# Patient Record
Sex: FEMALE | Race: OTHER | HISPANIC OR LATINO | URBAN - METROPOLITAN AREA
[De-identification: names, ages, dates, MRNs, and addresses within clinical notes are randomized per-mention and may not be internally consistent; named-entity substitution may affect disease eponyms.]

---

## 2021-11-16 ENCOUNTER — OUTPATIENT (OUTPATIENT)
Dept: OUTPATIENT SERVICES | Facility: HOSPITAL | Age: 56
LOS: 1 days | End: 2021-11-16

## 2021-11-16 ENCOUNTER — APPOINTMENT (OUTPATIENT)
Dept: CT IMAGING | Facility: CLINIC | Age: 56
End: 2021-11-16
Payer: COMMERCIAL

## 2021-11-16 PROCEDURE — 75574 CT ANGIO HRT W/3D IMAGE: CPT | Mod: 26

## 2023-10-17 PROBLEM — Z00.00 ENCOUNTER FOR PREVENTIVE HEALTH EXAMINATION: Status: ACTIVE | Noted: 2023-10-17

## 2023-10-30 ENCOUNTER — APPOINTMENT (OUTPATIENT)
Dept: NEUROSURGERY | Facility: CLINIC | Age: 58
End: 2023-10-30

## 2023-11-07 ENCOUNTER — APPOINTMENT (OUTPATIENT)
Dept: NEUROSURGERY | Facility: CLINIC | Age: 58
End: 2023-11-07
Payer: MEDICAID

## 2023-11-07 VITALS
HEIGHT: 62 IN | WEIGHT: 179 LBS | BODY MASS INDEX: 32.94 KG/M2 | OXYGEN SATURATION: 98 % | HEART RATE: 62 BPM | SYSTOLIC BLOOD PRESSURE: 120 MMHG | RESPIRATION RATE: 18 BRPM | DIASTOLIC BLOOD PRESSURE: 72 MMHG

## 2023-11-07 DIAGNOSIS — E78.5 HYPERLIPIDEMIA, UNSPECIFIED: ICD-10-CM

## 2023-11-07 DIAGNOSIS — E11.9 TYPE 2 DIABETES MELLITUS W/OUT COMPLICATIONS: ICD-10-CM

## 2023-11-07 DIAGNOSIS — Z86.39 PERSONAL HISTORY OF OTHER ENDOCRINE, NUTRITIONAL AND METABOLIC DISEASE: ICD-10-CM

## 2023-11-07 DIAGNOSIS — I10 ESSENTIAL (PRIMARY) HYPERTENSION: ICD-10-CM

## 2023-11-07 PROCEDURE — 99204 OFFICE O/P NEW MOD 45 MIN: CPT

## 2023-11-07 RX ORDER — LEVOTHYROXINE SODIUM 200 UG/1
CAPSULE ORAL
Refills: 0 | Status: ACTIVE | COMMUNITY

## 2023-11-07 RX ORDER — FENOFIBRATE 150 MG/1
CAPSULE ORAL
Refills: 0 | Status: ACTIVE | COMMUNITY

## 2023-11-07 RX ORDER — ATORVASTATIN CALCIUM 80 MG/1
TABLET, FILM COATED ORAL
Refills: 0 | Status: ACTIVE | COMMUNITY

## 2023-11-07 RX ORDER — METFORMIN HYDROCHLORIDE 625 MG/1
TABLET ORAL
Refills: 0 | Status: ACTIVE | COMMUNITY

## 2023-11-07 RX ORDER — LISINOPRIL 30 MG/1
TABLET ORAL
Refills: 0 | Status: ACTIVE | COMMUNITY

## 2023-11-07 RX ORDER — VALSARTAN 40 MG/1
TABLET, COATED ORAL
Refills: 0 | Status: ACTIVE | COMMUNITY

## 2023-11-09 LAB
CORTIS SERPL-MCNC: 9.2 UG/DL
FSH SERPL-MCNC: 39.3 IU/L
GH SERPL-MCNC: 0.03 NG/ML
IGF-1 INTERP: NORMAL
IGF-I BLD-MCNC: 79 NG/ML
LH SERPL-ACNC: 20.8 IU/L
PROLACTIN SERPL-MCNC: 6.3 NG/ML
T4 SERPL-MCNC: 8.9 UG/DL
TSH SERPL-ACNC: 2.87 UIU/ML

## 2023-11-10 LAB — ACTH-ESO: 19 PG/ML

## 2023-12-05 ENCOUNTER — APPOINTMENT (OUTPATIENT)
Dept: NEUROSURGERY | Facility: CLINIC | Age: 58
End: 2023-12-05

## 2023-12-15 ENCOUNTER — OUTPATIENT (OUTPATIENT)
Dept: OUTPATIENT SERVICES | Facility: HOSPITAL | Age: 58
LOS: 1 days | End: 2023-12-15
Payer: COMMERCIAL

## 2023-12-15 ENCOUNTER — APPOINTMENT (OUTPATIENT)
Dept: MRI IMAGING | Facility: HOSPITAL | Age: 58
End: 2023-12-15

## 2023-12-15 PROCEDURE — 70553 MRI BRAIN STEM W/O & W/DYE: CPT | Mod: 26

## 2023-12-15 PROCEDURE — A9585: CPT

## 2023-12-15 PROCEDURE — 70553 MRI BRAIN STEM W/O & W/DYE: CPT

## 2023-12-18 ENCOUNTER — NON-APPOINTMENT (OUTPATIENT)
Age: 58
End: 2023-12-18

## 2024-01-18 ENCOUNTER — APPOINTMENT (OUTPATIENT)
Dept: NEUROSURGERY | Facility: CLINIC | Age: 59
End: 2024-01-18
Payer: MEDICAID

## 2024-01-18 VITALS
HEIGHT: 62 IN | RESPIRATION RATE: 18 BRPM | WEIGHT: 179 LBS | HEART RATE: 60 BPM | OXYGEN SATURATION: 98 % | DIASTOLIC BLOOD PRESSURE: 75 MMHG | SYSTOLIC BLOOD PRESSURE: 125 MMHG | BODY MASS INDEX: 32.94 KG/M2 | TEMPERATURE: 97.3 F

## 2024-01-18 DIAGNOSIS — D35.2 BENIGN NEOPLASM OF PITUITARY GLAND: ICD-10-CM

## 2024-01-18 PROCEDURE — 99214 OFFICE O/P EST MOD 30 MIN: CPT

## 2024-01-19 PROBLEM — D35.2 PITUITARY ADENOMA: Status: ACTIVE | Noted: 2023-11-07

## 2024-01-19 NOTE — DATA REVIEWED
[de-identified] : ACC: [id number]     EXAM:  MR BRAIN WAW IC   ORDERED BY: [address]  PROCEDURE DATE:  12/15/2023    INTERPRETATION:  INDICATION: Pituitary adenoma workup.  TECHNIQUE: Sagittal and coronal T1 and coronal T2 weighted images of the sella and axial FLAIR images of the brain were obtained. Following the intravenous administration of Multihance, sagittal and coronal T1 weighted, coronal VIBE images through the sella, sagittal 3D T1 weighted images through the brain were obtained and gradient echo and diffusion imaging of the brain is obtained.  COMPARISON: None.  FINDINGS: The pituitary gland is normal in height with a normal superior concave border measuring 5.3 mm in craniocaudal diameter. There is a 1.2 mm focus of relative less enhancement just to the right of midline at the level of the stalk which could represent a small cyst versus a microadenoma. Prior images are not available for comparison. The stalk is midline. The cavernous sinuses enhance normally. The optic chiasm is normal..   Evaluation of the brain demonstrates the ventricles, cisternal spaces and cortical sulci to be appropriate for the patient stated age. There is no midline shift or extra-axial collections. Small vessel white matter ischemic changes are noted.  After contrast administration there is no abnormal parenchymal or leptomeningeal enhancement.    IMPRESSION: Small 1.2 mm focus of relative less enhancement just to the right of the midline at the level of the stalk within the pituitary gland could represent a tiny microadenoma versus a cyst. Small vessel white matter ischemic changes. No abnormal parenchymal or leptomeningeal enhancement.  --- End of Report ---     KHALED AL TAWIL MD; Resident Radiologist This document has been electronically signed. MONICA DELANEY MD; Attending Radiologist This document has been electronically signed. Dec 18 2023  9:58AM

## 2024-01-19 NOTE — ASSESSMENT
[FreeTextEntry1] : I reviewed the results of the most recent MRI imaging with the patient, which shows notable decreased size of pituitary mass. I also shared the results of her endocrine lab work up which is also grossly WNL except for slightly low GH. Patient is asymptomatic at this time. She will need to return to clinic with repeat MRI Brain at the end of this year.   PLAN: - repeat MRI Pituitary in Dec 2024 - follow up with Dr. Boone to review imaging   The patient was instructed that if she experiences symptoms of severe headache, syncope, unexplained nausea/vomiting, visual changes, seizure-like activity, new weakness or numbness of extremities, she should immediately call 911 or go to the emergency department.   Patient verbalizes agreement and understanding with plan of care.  I, Dr. Boone, personally performed the evaluation and management (E/M) services for this established patient who presents today with (a) new problem(s)/exacerbation of (an) existing condition(s). That E/M includes conducting the examination, assessing all new/exacerbated conditions, and establishing a new plan of care. Today, LATOYA Herzog, was here to observe my evaluation and management services for this new problem/exacerbated condition to be followed going forward.

## 2024-01-19 NOTE — PHYSICAL EXAM
[General Appearance - Alert] : alert [General Appearance - In No Acute Distress] : in no acute distress [General Appearance - Well Nourished] : well nourished [General Appearance - Well-Appearing] : healthy appearing [Oriented To Time, Place, And Person] : oriented to person, place, and time [Impaired Insight] : insight and judgment were intact [Affect] : the affect was normal [Memory Recent] : recent memory was not impaired [Motor Tone] : muscle tone was normal in all four extremities [Motor Strength] : muscle strength was normal in all four extremities [Balance] : balance was intact [Sclera] : the sclera and conjunctiva were normal [PERRL With Normal Accommodation] : pupils were equal in size, round, reactive to light, with normal accommodation [Outer Ear] : the ears and nose were normal in appearance [Neck Appearance] : the appearance of the neck was normal [] : no respiratory distress [Heart Rate And Rhythm] : heart rate was normal and rhythm regular [Abdomen Soft] : soft [Abnormal Walk] : normal gait [Skin Color & Pigmentation] : normal skin color and pigmentation [Skin Lesions] : no skin lesions

## 2024-01-19 NOTE — REASON FOR VISIT
[Follow-Up: _____] : a [unfilled] follow-up visit [Family Member] : family member [FreeTextEntry1] : Pituitary Adenoma [Interpreters_IDNumber] : [id number] [TWNoteComboBox1] : Martiniquais

## 2024-01-19 NOTE — HISTORY OF PRESENT ILLNESS
[FreeTextEntry1] : Pituitary Adenoma, Headaches [de-identified] : JUAN CARLOS THOMAS is a 58-year-old woman with a PMHx significant for HTN, HLD, Hypothyroidism, and DM II who was referred by Dr. Brianne Pedroza for neurosurgical evaluation of pituitary adenoma.  Patient had been suffering from migraines and workup imaging revealed a lesion on Pituitary gland. Last MRI was from 6/2022. She states she does have hypothyroidism and is on Levothyroxine which is managed by her PCP but she does not have an Endocrinologist.  TODAY 1/18/24 Patient presents to office tto review MRI Pituitary with and without contrast done on 12/15/23. Endocrine Labs done on 11/7/23 with GH 0.03, non-circulatory.  She states she continues to have daily headaches, near front of her head Denies changes in ring/shoe size. Denies excessive urination. Denies changes in vision. Denies galactorrhea.

## 2024-12-12 ENCOUNTER — RESULT REVIEW (OUTPATIENT)
Age: 59
End: 2024-12-12

## 2024-12-12 ENCOUNTER — OUTPATIENT (OUTPATIENT)
Dept: OUTPATIENT SERVICES | Facility: HOSPITAL | Age: 59
LOS: 1 days | End: 2024-12-12
Payer: MEDICAID

## 2024-12-12 ENCOUNTER — APPOINTMENT (OUTPATIENT)
Dept: MRI IMAGING | Facility: HOSPITAL | Age: 59
End: 2024-12-12

## 2024-12-12 PROCEDURE — A9585: CPT

## 2024-12-12 PROCEDURE — 70553 MRI BRAIN STEM W/O & W/DYE: CPT

## 2024-12-12 PROCEDURE — 70553 MRI BRAIN STEM W/O & W/DYE: CPT | Mod: 26

## 2025-01-07 PROBLEM — E11.9 DIABETES: Status: RESOLVED | Noted: 2023-11-07 | Resolved: 2025-01-07

## 2025-01-07 PROBLEM — Z86.39 HISTORY OF HYPOTHYROIDISM: Status: RESOLVED | Noted: 2023-11-07 | Resolved: 2025-01-07

## 2025-01-07 PROBLEM — E78.5 HYPERLIPIDEMIA: Status: RESOLVED | Noted: 2023-11-07 | Resolved: 2025-01-07

## 2025-01-07 PROBLEM — I10 HIGH BLOOD PRESSURE: Status: RESOLVED | Noted: 2023-11-07 | Resolved: 2025-01-07

## 2025-01-10 ENCOUNTER — APPOINTMENT (OUTPATIENT)
Dept: NEUROSURGERY | Facility: CLINIC | Age: 60
End: 2025-01-10
Payer: MEDICAID

## 2025-01-10 DIAGNOSIS — M54.16 RADICULOPATHY, LUMBAR REGION: ICD-10-CM

## 2025-01-10 DIAGNOSIS — D35.2 BENIGN NEOPLASM OF PITUITARY GLAND: ICD-10-CM

## 2025-01-10 DIAGNOSIS — Z86.39 PERSONAL HISTORY OF OTHER ENDOCRINE, NUTRITIONAL AND METABOLIC DISEASE: ICD-10-CM

## 2025-01-10 DIAGNOSIS — E78.5 HYPERLIPIDEMIA, UNSPECIFIED: ICD-10-CM

## 2025-01-10 DIAGNOSIS — I10 ESSENTIAL (PRIMARY) HYPERTENSION: ICD-10-CM

## 2025-01-10 DIAGNOSIS — G83.4 CAUDA EQUINA SYNDROME: ICD-10-CM

## 2025-01-10 DIAGNOSIS — E11.9 TYPE 2 DIABETES MELLITUS W/OUT COMPLICATIONS: ICD-10-CM

## 2025-01-10 PROCEDURE — 99215 OFFICE O/P EST HI 40 MIN: CPT

## 2025-07-01 ENCOUNTER — RX ONLY (RX ONLY)
Age: 60
End: 2025-07-01

## 2025-07-01 ENCOUNTER — OFFICE (OUTPATIENT)
Facility: LOCATION | Age: 60
Setting detail: OPHTHALMOLOGY
End: 2025-07-01
Payer: MEDICAID

## 2025-07-01 DIAGNOSIS — H43.393: ICD-10-CM

## 2025-07-01 DIAGNOSIS — H25.13: ICD-10-CM

## 2025-07-01 DIAGNOSIS — H25.11: ICD-10-CM

## 2025-07-01 PROBLEM — E11.9 DIABETES TYPE 2 NO RETINOPATHY: Status: ACTIVE | Noted: 2025-07-01

## 2025-07-01 PROBLEM — H16.223 DRY EYE SYNDROME K SICCA; BOTH EYES: Status: ACTIVE | Noted: 2025-07-01

## 2025-07-01 PROBLEM — H40.013 GLAUCOMA SUSPECT, LOW RISK 1-2 FACTORS; BOTH EYES: Status: ACTIVE | Noted: 2025-07-01

## 2025-07-01 PROCEDURE — 92136 OPHTHALMIC BIOMETRY: CPT | Performed by: OPHTHALMOLOGY

## 2025-07-01 PROCEDURE — 92202 OPSCPY EXTND ON/MAC DRAW: CPT | Performed by: OPHTHALMOLOGY

## 2025-07-01 PROCEDURE — 99214 OFFICE O/P EST MOD 30 MIN: CPT | Performed by: OPHTHALMOLOGY

## 2025-07-01 ASSESSMENT — KERATOMETRY
OD_CYLAXISANGLE_DEGREES: 119
OD_K1POWER_DIOPTERS: 46.00
OD_AXISANGLE2_DEGREES: 119
OD_AXISANGLE_DEGREES: 119
OS_AXISANGLE2_DEGREES: 086
OS_CYLPOWER_DEGREES: 0.75
OD_CYLPOWER_DEGREES: 1
OS_K1POWER_DIOPTERS: 46.50
OS_K2POWER_DIOPTERS: 47.25
OD_K1POWER_DIOPTERS: 46.00
OD_K2POWER_DIOPTERS: 47.00
OD_AXISANGLE_DEGREES: 119
OS_K1K2_AVERAGE: 46.875
OS_K1POWER_DIOPTERS: 46.50
OS_K2POWER_DIOPTERS: 47.25
OS_CYLAXISANGLE_DEGREES: 086
OS_AXISANGLE_DEGREES: 086
OD_K1K2_AVERAGE: 46.5
OS_AXISANGLE_DEGREES: 086
OD_K2POWER_DIOPTERS: 47.00

## 2025-07-01 ASSESSMENT — REFRACTION_AUTOREFRACTION
OD_SPHERE: +0.50
OS_SPHERE: +2.50
OS_CYLINDER: SPH
OD_AXIS: 033
OD_CYLINDER: -0.75

## 2025-07-01 ASSESSMENT — REFRACTION_MANIFEST
OD_AXIS: 020
OD_CYLINDER: -0.75
OD_VA1: 20/60+2
OS_SPHERE: +2.50
OS_CYLINDER: SPH
OS_SPHERE: +2.50
OS_CYLINDER: SPH
OD_VA1: 20/60
OD_CYLINDER: -0.75
OD_SPHERE: +0.50
OD_AXIS: 033
OS_VA1: 20/30
OD_SPHERE: +0.50
OS_VA1: 20/30

## 2025-07-01 ASSESSMENT — VISUAL ACUITY
OS_BCVA: 20/60
OD_BCVA: 20/40-2

## 2025-07-01 ASSESSMENT — TEAR BREAK UP TIME (TBUT)
OD_TBUT: 1+
OS_TBUT: 1+

## 2025-07-01 ASSESSMENT — CONFRONTATIONAL VISUAL FIELD TEST (CVF)
OD_FINDINGS: FULL
OS_FINDINGS: FULL

## 2025-07-01 ASSESSMENT — DECREASING TEAR LAKE - SEVERITY SCORE
OS_DEC_TEARLAKE: 1+
OD_DEC_TEARLAKE: 1+